# Patient Record
Sex: FEMALE | Race: WHITE | NOT HISPANIC OR LATINO
[De-identification: names, ages, dates, MRNs, and addresses within clinical notes are randomized per-mention and may not be internally consistent; named-entity substitution may affect disease eponyms.]

---

## 2017-08-17 ENCOUNTER — APPOINTMENT (OUTPATIENT)
Dept: INTERNAL MEDICINE | Facility: CLINIC | Age: 57
End: 2017-08-17
Payer: COMMERCIAL

## 2017-08-17 PROCEDURE — 36415 COLL VENOUS BLD VENIPUNCTURE: CPT

## 2017-08-17 PROCEDURE — 94010 BREATHING CAPACITY TEST: CPT

## 2017-08-17 PROCEDURE — 99386 PREV VISIT NEW AGE 40-64: CPT | Mod: 25

## 2019-10-10 ENCOUNTER — APPOINTMENT (OUTPATIENT)
Dept: FAMILY MEDICINE | Facility: CLINIC | Age: 59
End: 2019-10-10
Payer: COMMERCIAL

## 2019-10-10 ENCOUNTER — NON-APPOINTMENT (OUTPATIENT)
Age: 59
End: 2019-10-10

## 2019-10-10 VITALS
HEART RATE: 78 BPM | WEIGHT: 178 LBS | DIASTOLIC BLOOD PRESSURE: 90 MMHG | BODY MASS INDEX: 27.61 KG/M2 | HEIGHT: 67.5 IN | SYSTOLIC BLOOD PRESSURE: 156 MMHG

## 2019-10-10 VITALS — DIASTOLIC BLOOD PRESSURE: 86 MMHG | SYSTOLIC BLOOD PRESSURE: 134 MMHG

## 2019-10-10 DIAGNOSIS — Z83.3 FAMILY HISTORY OF DIABETES MELLITUS: ICD-10-CM

## 2019-10-10 DIAGNOSIS — Z78.9 OTHER SPECIFIED HEALTH STATUS: ICD-10-CM

## 2019-10-10 DIAGNOSIS — Z82.49 FAMILY HISTORY OF ISCHEMIC HEART DISEASE AND OTHER DISEASES OF THE CIRCULATORY SYSTEM: ICD-10-CM

## 2019-10-10 DIAGNOSIS — Z82.5 FAMILY HISTORY OF ASTHMA AND OTHER CHRONIC LOWER RESPIRATORY DISEASES: ICD-10-CM

## 2019-10-10 DIAGNOSIS — M50.20 OTHER CERVICAL DISC DISPLACEMENT, UNSPECIFIED CERVICAL REGION: ICD-10-CM

## 2019-10-10 DIAGNOSIS — Z00.00 ENCOUNTER FOR GENERAL ADULT MEDICAL EXAMINATION W/OUT ABNORMAL FINDINGS: ICD-10-CM

## 2019-10-10 DIAGNOSIS — M25.551 PAIN IN RIGHT HIP: ICD-10-CM

## 2019-10-10 PROCEDURE — 93000 ELECTROCARDIOGRAM COMPLETE: CPT

## 2019-10-10 PROCEDURE — 99213 OFFICE O/P EST LOW 20 MIN: CPT | Mod: 25

## 2019-10-10 PROCEDURE — 99386 PREV VISIT NEW AGE 40-64: CPT | Mod: 25
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
## 2019-10-10 NOTE — PLAN
[FreeTextEntry1] : CHECK ROUTINE LAB WORK\par EKG: NSR AT 69 BPM, NO ACUTE ST-T WAVE CHANGES\par STOOL OCCULT AND COLONOSCOPY ADVISED\par HEALTHY DIET AND LIFESTYLE MODIFICATIONS\par VISION SCREENING\par NEED MAMMOGRAM AND BONE DENSITY RESULTS FROM [facility]\par NEED PAIN  NOTES A IMAGING FROM [facility]\par FLU VACCINE DECLINED, CONSIDER SHINGRIX\par \par \par AVOID ACTIVITIES CAUSING PAIN AND FALL\par WILL START WITH XRAY HIP\par FURTHER RECOMMENDATIONS PENDING RESULTS \par CALL WITH ANY QUESTIONS, CONCERNS OR CHANGES

## 2019-10-10 NOTE — HEALTH RISK ASSESSMENT
[Good] : ~his/her~  mood as  good [Yes] : Yes [1 or 2 (0 pts)] : 1 or 2 (0 points) [2 - 4 times a month (2 pts)] : 2-4 times a month (2 points) [Never (0 pts)] : Never (0 points) [No] : In the past 12 months have you used drugs other than those required for medical reasons? No [One fall no injury in past year] : Patient reported one fall in the past year without injury [Patient reported bone density results were normal] : Patient reported bone density results were normal [Patient reported mammogram was normal] : Patient reported mammogram was normal [HIV Test offered] : HIV Test offered [Hepatitis C test offered] : Hepatitis C test offered [With Significant Other] : lives with significant other [None] : None [With Family] : lives with family [# of Members in Household ___] :  household currently consist of [unfilled] member(s) [Employed] : employed [] :  [High School] : high school [# Of Children ___] : has [unfilled] children [Feels Safe at Home] : Feels safe at home [Fully functional (bathing, dressing, toileting, transferring, walking, feeding)] : Fully functional (bathing, dressing, toileting, transferring, walking, feeding) [Reports changes in hearing] : Reports changes in hearing [Fully functional (using the telephone, shopping, preparing meals, housekeeping, doing laundry, using] : Fully functional and needs no help or supervision to perform IADLs (using the telephone, shopping, preparing meals, housekeeping, doing laundry, using transportation, managing medications and managing finances) [Reports normal functional visual acuity (ie: able to read med bottle)] : Reports normal functional visual acuity [Smoke Detector] : smoke detector [Carbon Monoxide Detector] : carbon monoxide detector [Safety elements used in home] : safety elements used in home [Sunscreen] : uses sunscreen [Seat Belt] :  uses seat belt [With Patient/Caregiver] : With Patient/Caregiver [] : No [de-identified] : SOCIAL [Audit-CScore] : 2 [Change in mental status noted] : No change in mental status noted [Language] : denies difficulty with language [Behavior] : denies difficulty with behavior [Learning/Retaining New Information] : denies difficulty learning/retaining new information [Handling Complex Tasks] : denies difficulty handling complex tasks [Reasoning] : denies difficulty with reasoning [Spatial Ability and Orientation] : denies difficulty with spatial ability and orientation [Reports changes in vision] : Reports no changes in vision [Reports changes in dental health] : Reports no changes in dental health [MammogramDate] : 07/19 [MammogramComments] : [facility] [PapSmearDate] : 2019 [PapSmearComments] : DR. KEY [BoneDensityDate] : 07/19 [BoneDensityComments] : [facility] [ColonoscopyComments] : HAS NOT HAD COLONOSCOPY [de-identified] : LOST HEARING IN RIGHT EAR [de-identified] : WEARS GLASSES FOR READING [de-identified] : SEEN [facility] [AdvancecareDate] : 10/19

## 2019-10-10 NOTE — PHYSICAL EXAM
[No Acute Distress] : no acute distress [Well Nourished] : well nourished [Well-Appearing] : well-appearing [Normal Sclera/Conjunctiva] : normal sclera/conjunctiva [Normal Outer Ear/Nose] : the outer ears and nose were normal in appearance [PERRL] : pupils equal round and reactive to light [No Lymphadenopathy] : no lymphadenopathy [Normal Oropharynx] : the oropharynx was normal [No Respiratory Distress] : no respiratory distress  [No Accessory Muscle Use] : no accessory muscle use [Supple] : supple [Normal Rate] : normal rate  [Clear to Auscultation] : lungs were clear to auscultation bilaterally [Normal S1, S2] : normal S1 and S2 [No Murmur] : no murmur heard [Regular Rhythm] : with a regular rhythm [Pedal Pulses Present] : the pedal pulses are present [No Edema] : there was no peripheral edema [Non Tender] : non-tender [Normal Bowel Sounds] : normal bowel sounds [Soft] : abdomen soft [No Joint Swelling] : no joint swelling [Grossly Normal Strength/Tone] : grossly normal strength/tone [Coordination Grossly Intact] : coordination grossly intact [No Focal Deficits] : no focal deficits [Normal Gait] : normal gait [Speech Grossly Normal] : speech grossly normal [Memory Grossly Normal] : memory grossly normal [Deep Tendon Reflexes (DTR)] : deep tendon reflexes were 2+ and symmetric [Normal Insight/Judgement] : insight and judgment were intact [EOMI] : extraocular movements intact [Normal TMs] : both tympanic membranes were normal [No Rash] : no rash [Alert and Oriented x3] : oriented to person, place, and time

## 2019-10-10 NOTE — REVIEW OF SYSTEMS
[Joint Pain] : joint pain [Fever] : no fever [Chills] : no chills [Fatigue] : no fatigue [Discharge] : no discharge [Pain] : no pain [Earache] : no earache [Hearing Loss] : no hearing loss [Nasal Discharge] : no nasal discharge [Sore Throat] : no sore throat [Chest Pain] : no chest pain [Palpitations] : no palpitations [Lower Ext Edema] : no lower extremity edema [Shortness Of Breath] : no shortness of breath [Wheezing] : no wheezing [Cough] : no cough [Abdominal Pain] : no abdominal pain [Nausea] : no nausea [Diarrhea] : diarrhea [Vomiting] : no vomiting [Dysuria] : no dysuria [Hematuria] : no hematuria [Muscle Weakness] : no muscle weakness [Muscle Pain] : no muscle pain [Itching] : no itching [Skin Rash] : no skin rash [Headache] : no headache [Dizziness] : no dizziness [Fainting] : no fainting [Suicidal] : not suicidal [Anxiety] : no anxiety [Depression] : no depression [Easy Bleeding] : no easy bleeding [Easy Bruising] : no easy bruising

## 2019-11-13 ENCOUNTER — RECORD ABSTRACTING (OUTPATIENT)
Age: 59
End: 2019-11-13

## 2019-12-10 ENCOUNTER — APPOINTMENT (OUTPATIENT)
Dept: FAMILY MEDICINE | Facility: CLINIC | Age: 59
End: 2019-12-10
Payer: COMMERCIAL

## 2019-12-10 VITALS
DIASTOLIC BLOOD PRESSURE: 80 MMHG | HEART RATE: 80 BPM | BODY MASS INDEX: 28.54 KG/M2 | SYSTOLIC BLOOD PRESSURE: 140 MMHG | HEIGHT: 67.5 IN | WEIGHT: 184 LBS

## 2019-12-10 PROCEDURE — 99214 OFFICE O/P EST MOD 30 MIN: CPT

## 2019-12-10 NOTE — PHYSICAL EXAM
[No Acute Distress] : no acute distress [Well Nourished] : well nourished [Well-Appearing] : well-appearing [No Accessory Muscle Use] : no accessory muscle use [No Respiratory Distress] : no respiratory distress  [Normal Rate] : normal rate  [Regular Rhythm] : with a regular rhythm [Clear to Auscultation] : lungs were clear to auscultation bilaterally [Normal S1, S2] : normal S1 and S2 [No Murmur] : no murmur heard [Soft] : abdomen soft [Pedal Pulses Present] : the pedal pulses are present [No Edema] : there was no peripheral edema [Normal Bowel Sounds] : normal bowel sounds [Non Tender] : non-tender [Normal Sclera/Conjunctiva] : normal sclera/conjunctiva [PERRL] : pupils equal round and reactive to light [No Lymphadenopathy] : no lymphadenopathy [Supple] : supple [Speech Grossly Normal] : speech grossly normal [Memory Grossly Normal] : memory grossly normal [Normal Affect] : the affect was normal

## 2019-12-10 NOTE — PLAN
[FreeTextEntry1] : RECENT LAB WORK REVIEWED\par COUNSELLED ON HEALTHY DIET AND LIFESTYLE MODIFICATIONS; HEALTHY WEIGHT LOSS \par START VITAMIN D 1000 DAILY AND WILL MONITOR LEVELS\par WILL ALSO MONITOR LIPIDS AND HBA1C\par FLU VACCINE DECLINED\par CALL WITH ANY QUESTIONS, CONCERNS OR CHANGES

## 2019-12-10 NOTE — REVIEW OF SYSTEMS
[Fever] : no fever [Chills] : no chills [Fatigue] : no fatigue [Discharge] : no discharge [Pain] : no pain [Chest Pain] : no chest pain [Palpitations] : no palpitations [Lower Ext Edema] : no lower extremity edema [Shortness Of Breath] : no shortness of breath [Wheezing] : no wheezing [Cough] : no cough [Abdominal Pain] : no abdominal pain [Nausea] : no nausea [Diarrhea] : diarrhea [Vomiting] : no vomiting [Dysuria] : no dysuria [Hematuria] : no hematuria [Headache] : no headache [Dizziness] : no dizziness [Fainting] : no fainting [Easy Bleeding] : no easy bleeding [Easy Bruising] : no easy bruising

## 2019-12-10 NOTE — HISTORY OF PRESENT ILLNESS
[FreeTextEntry1] : REVIEW LABS [de-identified] : MS. BHANDARI IS A PLEASANT 60 YO PRESENTING FOR FOLLOW-UP TO REVIEW LABS. NO KNOWN HISTORY OF ELEVATED CHOLESTEROL OR GLUCOSE.  DIET IS MOSTLY WELL-BALANCED, BUT IS EATING A LARGE AMOUNT OF CHEESES AND CARBOHYDRATES. IS NOT EXERCISING REGULARLY BUT WANTS TO START. IS NOT TAKING VITAMIN D SUPPLEMENTS. NO KNOWN PRIOR VITAMIN D INSUFFICIENCY.  HAS HAD NO HEADACHES, DIZZINESS, CHEST PAIN, SHORTNESS OF BREATH, GI OR URINARY COMPLAINTS. NO OTHER COMPLAINTS TODAY.  DOES NOT WANT FLU VACCINE.

## 2020-02-24 ENCOUNTER — APPOINTMENT (OUTPATIENT)
Dept: FAMILY MEDICINE | Facility: CLINIC | Age: 60
End: 2020-02-24
Payer: COMMERCIAL

## 2020-02-24 VITALS
WEIGHT: 186 LBS | DIASTOLIC BLOOD PRESSURE: 98 MMHG | BODY MASS INDEX: 28.85 KG/M2 | HEIGHT: 67.5 IN | HEART RATE: 74 BPM | SYSTOLIC BLOOD PRESSURE: 160 MMHG

## 2020-02-24 VITALS — SYSTOLIC BLOOD PRESSURE: 132 MMHG | DIASTOLIC BLOOD PRESSURE: 86 MMHG

## 2020-02-24 DIAGNOSIS — R03.0 ELEVATED BLOOD-PRESSURE READING, W/OUT DIAGNOSIS OF HYPERTENSION: ICD-10-CM

## 2020-02-24 PROCEDURE — 99213 OFFICE O/P EST LOW 20 MIN: CPT

## 2020-02-24 NOTE — PLAN
[FreeTextEntry1] : UNDER INCREASED STRESS AND NERVOUS AT DOCTORS VISITS.  AFTER RELAXING FOR A FEW MINUTES BLOOD PRESSURE MUCH IMPROVED\par SODIUM AVOIDANCE\par AGREES TO FOLLOW-UP IN ONE MONTH FOR BLOOD PRESSURE RECHECK PRIOR TO INITIATION OF POSSIBLE MEDICATION MANAGEMENT\par CALL WITH ANY QUESTIONS, CONCERNS OR CHANGES.

## 2020-02-24 NOTE — PHYSICAL EXAM
[Well Nourished] : well nourished [Well Developed] : well developed [No Acute Distress] : no acute distress [Well-Appearing] : well-appearing [No Respiratory Distress] : no respiratory distress  [Clear to Auscultation] : lungs were clear to auscultation bilaterally [No Accessory Muscle Use] : no accessory muscle use [Normal Rate] : normal rate  [Regular Rhythm] : with a regular rhythm [Normal S1, S2] : normal S1 and S2 [No Joint Swelling] : no joint swelling [Grossly Normal Strength/Tone] : grossly normal strength/tone

## 2020-02-24 NOTE — END OF VISIT
[FreeTextEntry3] : All medical record entries made by the Scribe were at my, Dr. Santiago's, direction and personally dictated by me on [2/24/2020]. I have reviewed the chart and agree that the record accurately reflects my personal performance of the history, physical exam, assessment and plan. I have also personally directed, reviewed and agreed with the chart.

## 2020-02-24 NOTE — ADDENDUM
[FreeTextEntry1] : I, Samantha Ribeiro, acted solely as a scribe for Dr. Santiago on this date [2/24/2020].

## 2020-02-24 NOTE — HISTORY OF PRESENT ILLNESS
[FreeTextEntry1] : FOLLOW UP HYPERTENSION  [de-identified] : MS. BHANDARI IS A PLEASANT 59 YEAR OLD PRESENTING FOR FOLLOW UP. HISTORY OF BORDERLINE HIGH BLOOD PRESSURE. REPORTS STRESS DUE TO A FAMILY MEMBER'S HEALTH. HAS HAD NO PALPITATION OR SYNCOPE. NO CHEST PAIN OR SHORTNESS OF BREATH.  DENIES EDEMA.  HAD BLOOD PRESSURE CHECKED A COUPLE MONTHS AGO PRIOR TO SEEING PAIN MANAGEMENT AND WAS MILDLY ELEVATED.  WAS NERVOUS AND HAD PAIN THAT DAY.  HAS NOT OTHERWISE CHECKED.  HAS NEVER BEEN ON MEDICATION FOR HYPERTENSION.

## 2021-03-30 ENCOUNTER — APPOINTMENT (OUTPATIENT)
Dept: FAMILY MEDICINE | Facility: CLINIC | Age: 61
End: 2021-03-30
Payer: COMMERCIAL

## 2021-03-30 VITALS — SYSTOLIC BLOOD PRESSURE: 132 MMHG | DIASTOLIC BLOOD PRESSURE: 78 MMHG

## 2021-03-30 VITALS
BODY MASS INDEX: 27.92 KG/M2 | HEIGHT: 67.5 IN | HEART RATE: 84 BPM | SYSTOLIC BLOOD PRESSURE: 140 MMHG | DIASTOLIC BLOOD PRESSURE: 70 MMHG | WEIGHT: 180 LBS

## 2021-03-30 DIAGNOSIS — H91.90 UNSPECIFIED HEARING LOSS, UNSPECIFIED EAR: ICD-10-CM

## 2021-03-30 DIAGNOSIS — Z12.39 ENCOUNTER FOR OTHER SCREENING FOR MALIGNANT NEOPLASM OF BREAST: ICD-10-CM

## 2021-03-30 DIAGNOSIS — H93.8X2 OTHER SPECIFIED DISORDERS OF LEFT EAR: ICD-10-CM

## 2021-03-30 PROCEDURE — 99072 ADDL SUPL MATRL&STAF TM PHE: CPT

## 2021-03-30 PROCEDURE — 99213 OFFICE O/P EST LOW 20 MIN: CPT

## 2021-03-30 NOTE — PHYSICAL EXAM
[No Acute Distress] : no acute distress [Well Nourished] : well nourished [Well-Appearing] : well-appearing [Normal Sclera/Conjunctiva] : normal sclera/conjunctiva [PERRL] : pupils equal round and reactive to light [Normal Outer Ear/Nose] : the outer ears and nose were normal in appearance [Normal Oropharynx] : the oropharynx was normal [No Lymphadenopathy] : no lymphadenopathy [Supple] : supple [No Respiratory Distress] : no respiratory distress  [No Accessory Muscle Use] : no accessory muscle use [Normal Rate] : normal rate  [Clear to Auscultation] : lungs were clear to auscultation bilaterally [Regular Rhythm] : with a regular rhythm [Normal S1, S2] : normal S1 and S2 [de-identified] : DULL TM ON LEFT NO ERYTHEMA

## 2021-03-30 NOTE — HISTORY OF PRESENT ILLNESS
[FreeTextEntry1] : F/U EAR PAIN/INFECTION [de-identified] : MS. BHANDARI IS A PLEASANT 59 YO PRESENTING FOR FOLLOW-UP.  ADVISED SHE HAS ECZEMA IN HER EAR.  CAN BE ITCHY.  SCRATCHED IT AND IT BECAME INFLAMED.  WENT TO DERMATOLOGIST ONE MONTH AGO.  HAD BEEN GIVEN A CREAM TO USE WHICH INITIALLY HELPED.  BECAME INCREASINGLY INFLAMED AND SAW ENT THREE WEEKS AGO AND GIVEN TOBRAMYCIN AND DEXAMETHASONE.  HAD STENT PLACED IN EAR SO THAT DROPS COULD GET IN EAR.  FOLLOWED UP ON FRIDAY AND WAS GIVEN METHYLPREDNISOLONE.  GOT BETTER.  MUFFLING SOUND RESOLVED.  STILL SOME CRACKLING.  NO PAIN ANYMORE.  HEARING BETTER NOW.  SEES ENT DR. OSPINA OFFICE.  WAS ADVISED TO HAVE HEARING TEST BUT HAS NOT HAD YET.  HAD FEVER INITIALLY WHICH RESOLVED.  COVID TEST WAS NEGATIVE AT ENT OFFICE.  WAS ALSO GIVEN PO ANTIBIOTICS.  IS NOT USING NASAL SPRAY.  DUE FOR MAMMOGRAM.  HAS NO BREAST COMPLAINTS.  NO PAIN, NIPPLE DISCHARGE, SKIN CHANGES OR LUMPS.

## 2021-03-30 NOTE — PLAN
[FreeTextEntry1] : NEED ENT CONSULTANT NOTE\par AVOIDANCE OF Q-TIPS\par RX NASAL SPRAY - REVIEWED USE\par TO GO FOR HEARING TEST\par ENT FOLLOW-UP\par RX FOR BREAST IMAGING\par FOLLOW-UP ALL SPECIALISTS AS DIRECTED \par FOLLOW-UP FOR CPE\par CALL WITH ANY QUESTIONS, CONCERNS OR CHANGES

## 2021-03-30 NOTE — REVIEW OF SYSTEMS
[Hearing Loss] : hearing loss [Negative] : Heme/Lymph [Sore Throat] : no sore throat [FreeTextEntry4] : SEE HPI

## 2022-07-18 ENCOUNTER — APPOINTMENT (OUTPATIENT)
Dept: INTERNAL MEDICINE | Facility: CLINIC | Age: 62
End: 2022-07-18

## 2022-07-18 VITALS
WEIGHT: 180 LBS | HEIGHT: 67.5 IN | BODY MASS INDEX: 27.92 KG/M2 | SYSTOLIC BLOOD PRESSURE: 128 MMHG | HEART RATE: 82 BPM | DIASTOLIC BLOOD PRESSURE: 83 MMHG

## 2022-07-18 DIAGNOSIS — Z12.11 ENCOUNTER FOR SCREENING FOR MALIGNANT NEOPLASM OF COLON: ICD-10-CM

## 2022-07-18 PROCEDURE — 36415 COLL VENOUS BLD VENIPUNCTURE: CPT

## 2022-07-18 PROCEDURE — 99386 PREV VISIT NEW AGE 40-64: CPT | Mod: 25

## 2022-07-18 NOTE — HISTORY OF PRESENT ILLNESS
[FreeTextEntry1] : physical exam  [de-identified] : Ms. CARLOS BHANDARI is a 62 year female comes to the office for physical exam.  Patient denies fever, cough SOB. No other complaints at this time.

## 2022-07-18 NOTE — HEALTH RISK ASSESSMENT
[Good] : ~his/her~  mood as  good [Never] : Never [Yes] : Yes [Monthly or less (1 pt)] : Monthly or less (1 point) [1 or 2 (0 pts)] : 1 or 2 (0 points) [Never (0 pts)] : Never (0 points) [No] : In the past 12 months have you used drugs other than those required for medical reasons? No [0] : 2) Feeling down, depressed, or hopeless: Not at all (0) [Patient refused screening] : Patient refused screening [PHQ-2 Negative - No further assessment needed] : PHQ-2 Negative - No further assessment needed [Patient reported mammogram was normal] : Patient reported mammogram was normal [HIV test declined] : HIV test declined [Hepatitis C test declined] : Hepatitis C test declined [Audit-CScore] : 1 [[id number]] : 0 [MammogramDate] : 05/22

## 2022-07-19 LAB
25(OH)D3 SERPL-MCNC: 34.1 NG/ML
ALBUMIN SERPL ELPH-MCNC: 4.8 G/DL
ALP BLD-CCNC: 64 U/L
ALT SERPL-CCNC: 23 U/L
ANION GAP SERPL CALC-SCNC: 12 MMOL/L
AST SERPL-CCNC: 23 U/L
BASOPHILS # BLD AUTO: 0.02 K/UL
BASOPHILS NFR BLD AUTO: 0.3 %
BILIRUB SERPL-MCNC: 1.1 MG/DL
BUN SERPL-MCNC: 16 MG/DL
CALCIUM SERPL-MCNC: 10 MG/DL
CHLORIDE SERPL-SCNC: 105 MMOL/L
CHOLEST SERPL-MCNC: 270 MG/DL
CO2 SERPL-SCNC: 24 MMOL/L
CREAT SERPL-MCNC: 0.58 MG/DL
EGFR: 103 ML/MIN/1.73M2
EOSINOPHIL # BLD AUTO: 0.11 K/UL
EOSINOPHIL NFR BLD AUTO: 1.7 %
ESTIMATED AVERAGE GLUCOSE: 131 MG/DL
GLUCOSE SERPL-MCNC: 101 MG/DL
HBA1C MFR BLD HPLC: 6.2 %
HCT VFR BLD CALC: 42.8 %
HDLC SERPL-MCNC: 70 MG/DL
HGB BLD-MCNC: 13.8 G/DL
IMM GRANULOCYTES NFR BLD AUTO: 0.3 %
LDLC SERPL CALC-MCNC: 177 MG/DL
LYMPHOCYTES # BLD AUTO: 2.71 K/UL
LYMPHOCYTES NFR BLD AUTO: 40.9 %
MAN DIFF?: NORMAL
MCHC RBC-ENTMCNC: 29.7 PG
MCHC RBC-ENTMCNC: 32.2 GM/DL
MCV RBC AUTO: 92 FL
MONOCYTES # BLD AUTO: 0.57 K/UL
MONOCYTES NFR BLD AUTO: 8.6 %
NEUTROPHILS # BLD AUTO: 3.2 K/UL
NEUTROPHILS NFR BLD AUTO: 48.2 %
NONHDLC SERPL-MCNC: 200 MG/DL
PLATELET # BLD AUTO: 286 K/UL
POTASSIUM SERPL-SCNC: 4.1 MMOL/L
PROT SERPL-MCNC: 7.4 G/DL
RBC # BLD: 4.65 M/UL
RBC # FLD: 13.7 %
SODIUM SERPL-SCNC: 140 MMOL/L
TRIGL SERPL-MCNC: 117 MG/DL
TSH SERPL-ACNC: 0.62 UIU/ML
WBC # FLD AUTO: 6.63 K/UL

## 2022-07-31 ENCOUNTER — NON-APPOINTMENT (OUTPATIENT)
Age: 62
End: 2022-07-31

## 2022-08-11 ENCOUNTER — NON-APPOINTMENT (OUTPATIENT)
Age: 62
End: 2022-08-11

## 2022-10-25 ENCOUNTER — APPOINTMENT (OUTPATIENT)
Dept: INTERNAL MEDICINE | Facility: CLINIC | Age: 62
End: 2022-10-25

## 2022-10-25 VITALS
HEART RATE: 84 BPM | OXYGEN SATURATION: 98 % | BODY MASS INDEX: 28.25 KG/M2 | WEIGHT: 180 LBS | HEIGHT: 67 IN | DIASTOLIC BLOOD PRESSURE: 80 MMHG | SYSTOLIC BLOOD PRESSURE: 136 MMHG

## 2022-10-25 DIAGNOSIS — H60.90 UNSPECIFIED OTITIS EXTERNA, UNSPECIFIED EAR: ICD-10-CM

## 2022-10-25 PROCEDURE — 36415 COLL VENOUS BLD VENIPUNCTURE: CPT

## 2022-10-25 PROCEDURE — 99214 OFFICE O/P EST MOD 30 MIN: CPT | Mod: 25

## 2022-10-25 NOTE — HISTORY OF PRESENT ILLNESS
[FreeTextEntry1] : follow up/ right ear pain  [de-identified] : Ms. CARLOS BHANDARI is a 62 year female comes to the office for follow up of chronic medical conditions and right ear pain x 2 weeks. patient denies change in hearing.    Patient denies fever, cough SOB. No other complaints at this time.

## 2022-10-25 NOTE — HEALTH RISK ASSESSMENT
[0] : 2) Feeling down, depressed, or hopeless: Not at all (0) [PHQ-2 Negative - No further assessment needed] : PHQ-2 Negative - No further assessment needed [[id number]] : 0

## 2022-10-25 NOTE — PHYSICAL EXAM
[No Acute Distress] : no acute distress [Well Nourished] : well nourished [Well Developed] : well developed [Well-Appearing] : well-appearing [Normal Sclera/Conjunctiva] : normal sclera/conjunctiva [PERRL] : pupils equal round and reactive to light [EOMI] : extraocular movements intact [Normal Outer Ear/Nose] : the outer ears and nose were normal in appearance [Normal Oropharynx] : the oropharynx was normal [No JVD] : no jugular venous distention [No Lymphadenopathy] : no lymphadenopathy [Supple] : supple [Thyroid Normal, No Nodules] : the thyroid was normal and there were no nodules present [No Respiratory Distress] : no respiratory distress  [No Accessory Muscle Use] : no accessory muscle use [Clear to Auscultation] : lungs were clear to auscultation bilaterally [Normal Rate] : normal rate  [Regular Rhythm] : with a regular rhythm [Normal S1, S2] : normal S1 and S2 [No Murmur] : no murmur heard [No Carotid Bruits] : no carotid bruits [No Abdominal Bruit] : a ~M bruit was not heard ~T in the abdomen [No Varicosities] : no varicosities [Pedal Pulses Present] : the pedal pulses are present [No Edema] : there was no peripheral edema [No Palpable Aorta] : no palpable aorta [No Extremity Clubbing/Cyanosis] : no extremity clubbing/cyanosis [Soft] : abdomen soft [Non Tender] : non-tender [Non-distended] : non-distended [No Masses] : no abdominal mass palpated [No HSM] : no HSM [Normal Bowel Sounds] : normal bowel sounds [Normal Posterior Cervical Nodes] : no posterior cervical lymphadenopathy [Normal Anterior Cervical Nodes] : no anterior cervical lymphadenopathy [No CVA Tenderness] : no CVA  tenderness [No Spinal Tenderness] : no spinal tenderness [No Joint Swelling] : no joint swelling [Grossly Normal Strength/Tone] : grossly normal strength/tone [No Rash] : no rash [Coordination Grossly Intact] : coordination grossly intact [No Focal Deficits] : no focal deficits [Normal Gait] : normal gait [Deep Tendon Reflexes (DTR)] : deep tendon reflexes were 2+ and symmetric [Normal Affect] : the affect was normal [Normal Insight/Judgement] : insight and judgment were intact [de-identified] : RIght ear canal with white discahrge. Left ear normal

## 2022-10-25 NOTE — PLAN
[FreeTextEntry1] : otitis externa- will prescribe otic antibiotics. \par \par HLD- will test lipid panel today and call patient with results\par Patient will continue atorvastatin as prescribed. \par \par Prior to appointment and during encounter with patient extensive medical records were reviewed including but not limited to, Hospital records records, out patient records, laboratory data and microbiology data \par In addition extensive time was also spent in reviewing diagnostic studies.\par \par Total encounter total time 30 mins\par >50% of time spent counseling/coordinating care\par \par \par Counseling included abnormal lab results, differential diagnoses, treatment options, risks and benefits, lifestyle changes, current condition, medications, and dose adjustments. \par The patient was interactive, attentive, asked questions, and verbalized understanding

## 2022-10-26 LAB
ALBUMIN SERPL ELPH-MCNC: 4.8 G/DL
ALP BLD-CCNC: 71 U/L
ALT SERPL-CCNC: 32 U/L
ANION GAP SERPL CALC-SCNC: 13 MMOL/L
AST SERPL-CCNC: 29 U/L
BASOPHILS # BLD AUTO: 0.03 K/UL
BASOPHILS NFR BLD AUTO: 0.5 %
BILIRUB SERPL-MCNC: 0.9 MG/DL
BUN SERPL-MCNC: 18 MG/DL
CALCIUM SERPL-MCNC: 9.9 MG/DL
CHLORIDE SERPL-SCNC: 104 MMOL/L
CHOLEST SERPL-MCNC: 179 MG/DL
CO2 SERPL-SCNC: 25 MMOL/L
CREAT SERPL-MCNC: 0.56 MG/DL
EGFR: 104 ML/MIN/1.73M2
EOSINOPHIL # BLD AUTO: 0.13 K/UL
EOSINOPHIL NFR BLD AUTO: 2 %
ESTIMATED AVERAGE GLUCOSE: 126 MG/DL
GLUCOSE SERPL-MCNC: 124 MG/DL
HBA1C MFR BLD HPLC: 6 %
HCT VFR BLD CALC: 41.4 %
HDLC SERPL-MCNC: 65 MG/DL
HGB BLD-MCNC: 13.3 G/DL
IMM GRANULOCYTES NFR BLD AUTO: 0.2 %
LDLC SERPL CALC-MCNC: 88 MG/DL
LYMPHOCYTES # BLD AUTO: 2.92 K/UL
LYMPHOCYTES NFR BLD AUTO: 45.6 %
MAN DIFF?: NORMAL
MCHC RBC-ENTMCNC: 30.2 PG
MCHC RBC-ENTMCNC: 32.1 GM/DL
MCV RBC AUTO: 93.9 FL
MONOCYTES # BLD AUTO: 0.55 K/UL
MONOCYTES NFR BLD AUTO: 8.6 %
NEUTROPHILS # BLD AUTO: 2.77 K/UL
NEUTROPHILS NFR BLD AUTO: 43.1 %
NONHDLC SERPL-MCNC: 114 MG/DL
PLATELET # BLD AUTO: 272 K/UL
POTASSIUM SERPL-SCNC: 4.4 MMOL/L
PROT SERPL-MCNC: 7.5 G/DL
RBC # BLD: 4.41 M/UL
RBC # FLD: 13.4 %
SODIUM SERPL-SCNC: 142 MMOL/L
TRIGL SERPL-MCNC: 131 MG/DL
WBC # FLD AUTO: 6.41 K/UL

## 2023-05-18 ENCOUNTER — APPOINTMENT (OUTPATIENT)
Dept: INTERNAL MEDICINE | Facility: CLINIC | Age: 63
End: 2023-05-18
Payer: MEDICAID

## 2023-05-18 VITALS
DIASTOLIC BLOOD PRESSURE: 78 MMHG | SYSTOLIC BLOOD PRESSURE: 138 MMHG | HEIGHT: 67 IN | WEIGHT: 180 LBS | BODY MASS INDEX: 28.25 KG/M2 | HEART RATE: 93 BPM

## 2023-05-18 DIAGNOSIS — M79.671 PAIN IN RIGHT FOOT: ICD-10-CM

## 2023-05-18 PROCEDURE — 99214 OFFICE O/P EST MOD 30 MIN: CPT

## 2023-05-18 NOTE — HISTORY OF PRESENT ILLNESS
[FreeTextEntry1] : right foot pain/ left wrist pain.  [de-identified] : Ms. CARLOS BHANDARI is a 62 year female comes to the office c/o right foot pain x several months getting progressively worse over the past 2-3 weeks. Pain is 3/10 non radiating worse with walking, relieved with rest. Patient also complains of left elbow and left wrist pain x 1-2 weeks 4/10 non radiating worse with movement, relieved with rest. Patient denies fever, cough SOB. No other complaints at this time.

## 2023-05-18 NOTE — PHYSICAL EXAM
[No Acute Distress] : no acute distress [Well Nourished] : well nourished [Well Developed] : well developed [Well-Appearing] : well-appearing [Normal Sclera/Conjunctiva] : normal sclera/conjunctiva [PERRL] : pupils equal round and reactive to light [EOMI] : extraocular movements intact [Normal Outer Ear/Nose] : the outer ears and nose were normal in appearance [Normal Oropharynx] : the oropharynx was normal [No JVD] : no jugular venous distention [No Lymphadenopathy] : no lymphadenopathy [Supple] : supple [Thyroid Normal, No Nodules] : the thyroid was normal and there were no nodules present [No Respiratory Distress] : no respiratory distress  [No Accessory Muscle Use] : no accessory muscle use [Clear to Auscultation] : lungs were clear to auscultation bilaterally [Normal Rate] : normal rate  [Regular Rhythm] : with a regular rhythm [Normal S1, S2] : normal S1 and S2 [No Murmur] : no murmur heard [No Carotid Bruits] : no carotid bruits [No Abdominal Bruit] : a ~M bruit was not heard ~T in the abdomen [No Varicosities] : no varicosities [Pedal Pulses Present] : the pedal pulses are present [No Edema] : there was no peripheral edema [No Palpable Aorta] : no palpable aorta [No Extremity Clubbing/Cyanosis] : no extremity clubbing/cyanosis [Soft] : abdomen soft [Non Tender] : non-tender [Non-distended] : non-distended [No Masses] : no abdominal mass palpated [No HSM] : no HSM [Normal Bowel Sounds] : normal bowel sounds [Normal Posterior Cervical Nodes] : no posterior cervical lymphadenopathy [Normal Anterior Cervical Nodes] : no anterior cervical lymphadenopathy [No CVA Tenderness] : no CVA  tenderness [No Spinal Tenderness] : no spinal tenderness [No Joint Swelling] : no joint swelling [Grossly Normal Strength/Tone] : grossly normal strength/tone [No Rash] : no rash [Coordination Grossly Intact] : coordination grossly intact [No Focal Deficits] : no focal deficits [Normal Gait] : normal gait [Deep Tendon Reflexes (DTR)] : deep tendon reflexes were 2+ and symmetric [Normal Affect] : the affect was normal [Normal Insight/Judgement] : insight and judgment were intact [de-identified] : pain with palpation of right mid foot and left wrist.

## 2023-05-18 NOTE — HEALTH RISK ASSESSMENT
[0] : 2) Feeling down, depressed, or hopeless: Not at all (0) [PHQ-2 Negative - No further assessment needed] : PHQ-2 Negative - No further assessment needed [[id number]] : 0 [Never] : Never

## 2023-06-30 ENCOUNTER — APPOINTMENT (OUTPATIENT)
Dept: ORTHOPEDIC SURGERY | Facility: CLINIC | Age: 63
End: 2023-06-30
Payer: MEDICAID

## 2023-06-30 VITALS — DIASTOLIC BLOOD PRESSURE: 83 MMHG | HEART RATE: 75 BPM | SYSTOLIC BLOOD PRESSURE: 151 MMHG

## 2023-06-30 DIAGNOSIS — G56.03 CARPAL TUNNEL SYNDROM,BILATERAL UPPER LIMBS: ICD-10-CM

## 2023-06-30 DIAGNOSIS — M25.531 PAIN IN RIGHT WRIST: ICD-10-CM

## 2023-06-30 DIAGNOSIS — M25.532 PAIN IN LEFT WRIST: ICD-10-CM

## 2023-06-30 PROCEDURE — 99204 OFFICE O/P NEW MOD 45 MIN: CPT | Mod: 25

## 2023-06-30 PROCEDURE — 73110 X-RAY EXAM OF WRIST: CPT | Mod: 50

## 2023-06-30 PROCEDURE — 20526 THER INJECTION CARP TUNNEL: CPT | Mod: 50

## 2023-06-30 NOTE — PHYSICAL EXAM
[de-identified] : Examination of the bilateral wrist reveals discomfort with compression at the level of the volar carpal tunnel eliciting numbness/tingling throughout the fingertips\par \par Examination at the level of the bilateral wrist reveals tenderness at the level of the first dorsal compartment with a positive finkelstein.\par Examination of the bilateral thumb basal joint reveals pain with compression of the CMC joint with a positive grind test for pain\par \par  [de-identified] : [4] views of [bilateral hands and wrists] were obtained today in my office and were seen by me and discussed with the patient. 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

## 2023-06-30 NOTE — ASSESSMENT
[FreeTextEntry1] : ASSESSMENT:\par The patient comes in today with chronic exacerbated symptoms of bilateral carpal tunnel disease with significant wrist and hand discomfort as well as bilateral basal joint arthropathy and adjacent tendinopathy bilaterally.  At this point in time it is prudent to commence bracing.  She is reluctant to proceed with any kind of nerve study and her symptoms have been present for quite some time now.  She elects for bilateral carpal tunnel injections\par \par The patient was adequately and thoroughly informed of my assessment of their current condition(s).  - This may diminish bodily function for the extremity.\par We discussed prognosis, treatment modalities including operative and nonoperative options for the above diagnostic assessment.\par [For this I was able to review other physician’s note(s) including reviewing other tests, imaging results as well as personally view these results for my own interpretation.]\par \par \par Injection:\par \par The risks and benefits of a steroid injection were discussed in detail. The risks include but are not limited to: pain, infection, swelling, flare response, bleeding, subcutaneous fat atrophy, skin depigmentation and/or elevation of blood sugar. The risk of incomplete resolution of symptoms, recurrence and additional intervention was reviewed and considered by the patient. \par The patient agreed to proceed and under a sterile prep, I injected 1 unit into 2 cc of a combination of Celestone and Lidocaine into the left carpal tunnel, right carpal tunnel. The patient tolerated the injection well.\par \par The patient was adequately and thoroughly informed of my assessment of their current condition(s). \par \par DISCUSSION:\par 1.  I am hopeful that the injection for bilateral carpal tunnels will lead to improvement of her symptoms.  She will brace for the condition as needed\par 2.  We will continue to observe her basal joint arthropathy symptoms bilaterally\par \par

## 2023-06-30 NOTE — HISTORY OF PRESENT ILLNESS
[FreeTextEntry1] : Tiffanie is a very pleasant 63-year-old female who for multiple years now has been suffering from numbness and tingling to bilateral wrist hands and fingertips.  She describes discomfort at the wrists.  She tells me that she did have a nerve study performed in the past and is fully adamant that she would not like to proceed with any kind of nerve study.\par \par She also describes pain at the level of bilateral wrists and bases of the thumbs with difficulty with activities that require pinch and

## 2023-07-19 ENCOUNTER — RX RENEWAL (OUTPATIENT)
Age: 63
End: 2023-07-19

## 2023-09-01 ENCOUNTER — APPOINTMENT (OUTPATIENT)
Dept: INTERNAL MEDICINE | Facility: CLINIC | Age: 63
End: 2023-09-01
Payer: MEDICAID

## 2023-09-01 VITALS
BODY MASS INDEX: 28.25 KG/M2 | WEIGHT: 180 LBS | SYSTOLIC BLOOD PRESSURE: 130 MMHG | HEIGHT: 67 IN | DIASTOLIC BLOOD PRESSURE: 70 MMHG

## 2023-09-01 DIAGNOSIS — Z09 ENCOUNTER FOR FOLLOW-UP EXAMINATION AFTER COMPLETED TREATMENT FOR CONDITIONS OTHER THAN MALIGNANT NEOPLASM: ICD-10-CM

## 2023-09-01 PROCEDURE — 99214 OFFICE O/P EST MOD 30 MIN: CPT

## 2023-09-01 NOTE — HEALTH RISK ASSESSMENT
[0] : 2) Feeling down, depressed, or hopeless: Not at all (0) [PHQ-2 Negative - No further assessment needed] : PHQ-2 Negative - No further assessment needed [[id number]] : 0

## 2023-09-01 NOTE — PHYSICAL EXAM
[No Acute Distress] : no acute distress [Well Nourished] : well nourished [Well Developed] : well developed [Well-Appearing] : well-appearing [Normal Outer Ear/Nose] : the outer ears and nose were normal in appearance [Normal Oropharynx] : the oropharynx was normal [No JVD] : no jugular venous distention [No Lymphadenopathy] : no lymphadenopathy [Supple] : supple [No Respiratory Distress] : no respiratory distress  [No Accessory Muscle Use] : no accessory muscle use [Clear to Auscultation] : lungs were clear to auscultation bilaterally [Normal Rate] : normal rate  [Regular Rhythm] : with a regular rhythm [Normal S1, S2] : normal S1 and S2 [No Carotid Bruits] : no carotid bruits [No Abdominal Bruit] : a ~M bruit was not heard ~T in the abdomen [No Varicosities] : no varicosities [No Edema] : there was no peripheral edema [No Palpable Aorta] : no palpable aorta [No Extremity Clubbing/Cyanosis] : no extremity clubbing/cyanosis [Soft] : abdomen soft [Non Tender] : non-tender [Non-distended] : non-distended [No HSM] : no HSM [Normal Bowel Sounds] : normal bowel sounds [Normal Posterior Cervical Nodes] : no posterior cervical lymphadenopathy [Normal Anterior Cervical Nodes] : no anterior cervical lymphadenopathy [No CVA Tenderness] : no CVA  tenderness [No Spinal Tenderness] : no spinal tenderness [No Joint Swelling] : no joint swelling [Grossly Normal Strength/Tone] : grossly normal strength/tone [No Rash] : no rash [Coordination Grossly Intact] : coordination grossly intact [Normal Gait] : normal gait [Normal Affect] : the affect was normal [Normal Insight/Judgement] : insight and judgment were intact [de-identified] : right side facial droop, patient has eye patch over right eye

## 2023-09-01 NOTE — PLAN
[FreeTextEntry1] : Hospital discharge follow up  Bell's Palsy- Patient will complete medication as prescribed by ER- patient will follow with Neurologist   Prior to appointment and during encounter with patient extensive medical records were reviewed including but not limited to, Hospital records, out patient records, laboratory data and microbiology data In addition extensive time was also spent in reviewing diagnostic studies.   Total encounter total time 30 mins >50% of time spent counseling/coordinating care  Counseling included abnormal lab results, differential diagnoses, treatment options, risks and benefits, lifestyle changes, current condition, medications, and dose adjustments.  The patient was interactive, attentive, asked questions, and verbalized understanding

## 2023-09-01 NOTE — HISTORY OF PRESENT ILLNESS
[FreeTextEntry1] : follow up after hospital discharge [de-identified] : Ms. CARLOS BHANDARI is a 63 year female comes to the office c/o for follow up after hospital discharge.  On 08/30/23 patient went to [facility] for right side facial droop. Blood work, MRI Brain,CXR and CT Angio brain were all unremarkable and ruled out stroke, patient was diagnosed with Bell's Palsy abd discharged home same day with Valacyclovir and Prednisone.   In office today patient feels well and has no complaints at this time

## 2023-09-11 ENCOUNTER — APPOINTMENT (OUTPATIENT)
Dept: ORTHOPEDIC SURGERY | Facility: CLINIC | Age: 63
End: 2023-09-11

## 2023-09-19 ENCOUNTER — APPOINTMENT (OUTPATIENT)
Dept: INTERNAL MEDICINE | Facility: CLINIC | Age: 63
End: 2023-09-19
Payer: MEDICAID

## 2023-09-19 VITALS
HEIGHT: 67 IN | DIASTOLIC BLOOD PRESSURE: 86 MMHG | BODY MASS INDEX: 28.56 KG/M2 | TEMPERATURE: 98.8 F | HEART RATE: 72 BPM | SYSTOLIC BLOOD PRESSURE: 130 MMHG | WEIGHT: 182 LBS

## 2023-09-19 DIAGNOSIS — J06.9 ACUTE UPPER RESPIRATORY INFECTION, UNSPECIFIED: ICD-10-CM

## 2023-09-19 DIAGNOSIS — H66.90 OTITIS MEDIA, UNSPECIFIED, UNSPECIFIED EAR: ICD-10-CM

## 2023-09-19 PROCEDURE — 99214 OFFICE O/P EST MOD 30 MIN: CPT

## 2023-09-19 RX ORDER — NEOMYCIN AND POLYMYXIN B SULFATES AND HYDROCORTISONE OTIC 10; 3.5; 1 MG/ML; MG/ML; [USP'U]/ML
3.5-10000-1 SUSPENSION AURICULAR (OTIC) 4 TIMES DAILY
Qty: 1 | Refills: 1 | Status: DISCONTINUED | COMMUNITY
Start: 2022-10-25 | End: 2023-09-19

## 2023-09-19 RX ORDER — FLUTICASONE PROPIONATE 50 UG/1
50 SPRAY, METERED NASAL DAILY
Qty: 1 | Refills: 0 | Status: DISCONTINUED | COMMUNITY
Start: 2021-03-30 | End: 2023-09-19

## 2023-09-19 RX ORDER — DICLOFENAC SODIUM 1% 10 MG/G
1 GEL TOPICAL
Qty: 200 | Refills: 0 | Status: DISCONTINUED | COMMUNITY
Start: 2023-05-18 | End: 2023-09-19

## 2023-09-19 RX ORDER — TRIAMCINOLONE ACETONIDE 5 MG/G
0.5 OINTMENT TOPICAL
Qty: 30 | Refills: 0 | Status: DISCONTINUED | COMMUNITY
Start: 2021-03-05 | End: 2023-09-19

## 2023-09-20 LAB
RAPID RVP RESULT: NOT DETECTED
SARS-COV-2 RNA PNL RESP NAA+PROBE: NOT DETECTED

## 2023-09-28 ENCOUNTER — APPOINTMENT (OUTPATIENT)
Dept: OTOLARYNGOLOGY | Facility: CLINIC | Age: 63
End: 2023-09-28
Payer: MEDICAID

## 2023-09-28 VITALS
SYSTOLIC BLOOD PRESSURE: 111 MMHG | HEIGHT: 67 IN | WEIGHT: 179 LBS | BODY MASS INDEX: 28.09 KG/M2 | DIASTOLIC BLOOD PRESSURE: 70 MMHG | HEART RATE: 73 BPM

## 2023-09-28 PROCEDURE — 31231 NASAL ENDOSCOPY DX: CPT

## 2023-09-28 PROCEDURE — 99204 OFFICE O/P NEW MOD 45 MIN: CPT | Mod: 25

## 2023-10-02 ENCOUNTER — NON-APPOINTMENT (OUTPATIENT)
Age: 63
End: 2023-10-02

## 2023-10-02 LAB
EBV EA AB SER IA-ACNC: 61.5 U/ML
EBV EA AB TITR SER IF: POSITIVE
EBV EA IGG SER QL IA: >600 U/ML
EBV EA IGG SER-ACNC: POSITIVE
EBV EA IGM SER IA-ACNC: POSITIVE
EBV PATRN SPEC IB-IMP: NORMAL
EBV VCA IGG SER IA-ACNC: 615 U/ML
EBV VCA IGM SER QL IA: 130 U/ML
EPSTEIN-BARR VIRUS CAPSID ANTIGEN IGG: POSITIVE

## 2023-10-11 ENCOUNTER — NON-APPOINTMENT (OUTPATIENT)
Age: 63
End: 2023-10-11

## 2023-10-11 RX ORDER — BECLOMETHASONE DIPROPIONATE HFA 80 UG/1
80 AEROSOL, METERED RESPIRATORY (INHALATION)
Qty: 1 | Refills: 3 | Status: ACTIVE | COMMUNITY
Start: 2023-10-11 | End: 1900-01-01

## 2023-10-16 ENCOUNTER — APPOINTMENT (OUTPATIENT)
Dept: INTERNAL MEDICINE | Facility: CLINIC | Age: 63
End: 2023-10-16
Payer: MEDICAID

## 2023-10-16 VITALS
SYSTOLIC BLOOD PRESSURE: 110 MMHG | DIASTOLIC BLOOD PRESSURE: 70 MMHG | WEIGHT: 179 LBS | HEART RATE: 78 BPM | HEIGHT: 67 IN | BODY MASS INDEX: 28.09 KG/M2

## 2023-10-16 DIAGNOSIS — H57.11 OCULAR PAIN, RIGHT EYE: ICD-10-CM

## 2023-10-16 PROCEDURE — 99213 OFFICE O/P EST LOW 20 MIN: CPT

## 2023-10-16 RX ORDER — PREDNISONE 10 MG/1
10 TABLET ORAL
Qty: 42 | Refills: 0 | Status: COMPLETED | COMMUNITY
Start: 2023-09-28 | End: 2023-10-16

## 2023-10-17 ENCOUNTER — OUTPATIENT (OUTPATIENT)
Dept: OUTPATIENT SERVICES | Facility: HOSPITAL | Age: 63
LOS: 1 days | End: 2023-10-17
Payer: MEDICAID

## 2023-10-17 ENCOUNTER — APPOINTMENT (OUTPATIENT)
Dept: CT IMAGING | Facility: CLINIC | Age: 63
End: 2023-10-17

## 2023-10-17 DIAGNOSIS — J34.3 HYPERTROPHY OF NASAL TURBINATES: ICD-10-CM

## 2023-10-17 DIAGNOSIS — J30.9 ALLERGIC RHINITIS, UNSPECIFIED: ICD-10-CM

## 2023-10-17 DIAGNOSIS — J32.9 CHRONIC SINUSITIS, UNSPECIFIED: ICD-10-CM

## 2023-10-17 PROCEDURE — 70486 CT MAXILLOFACIAL W/O DYE: CPT | Mod: 26

## 2023-10-20 ENCOUNTER — RX ONLY (RX ONLY)
Age: 63
End: 2023-10-20

## 2023-10-20 ENCOUNTER — OFFICE (OUTPATIENT)
Dept: URBAN - METROPOLITAN AREA CLINIC 104 | Facility: CLINIC | Age: 63
Setting detail: OPHTHALMOLOGY
End: 2023-10-20
Payer: MEDICAID

## 2023-10-20 DIAGNOSIS — H25.13: ICD-10-CM

## 2023-10-20 DIAGNOSIS — H02.221: ICD-10-CM

## 2023-10-20 DIAGNOSIS — H16.223: ICD-10-CM

## 2023-10-20 PROCEDURE — 92004 COMPRE OPH EXAM NEW PT 1/>: CPT | Performed by: OPHTHALMOLOGY

## 2023-10-20 ASSESSMENT — REFRACTION_MANIFEST
OS_SPHERE: +0.25
OS_ADD: +2.50
OU_VA: 20/20
OD_AXIS: 120
OD_VA1: 20/25
OD_CYLINDER: -1.25
OD_SPHERE: +1.25
OS_CYLINDER: SPH
OS_VA1: 20/20-1
OD_ADD: +2.50

## 2023-10-20 ASSESSMENT — SUPERFICIAL PUNCTATE KERATITIS (SPK)
OD_SPK: 1+ 2+
OS_SPK: 1+ 2+

## 2023-10-20 ASSESSMENT — REFRACTION_AUTOREFRACTION
OD_SPHERE: +1.75
OS_CYLINDER: SPH
OD_CYLINDER: -1.25
OS_SPHERE: +0.50
OD_AXIS: 121

## 2023-10-20 ASSESSMENT — KERATOMETRY
OS_AXISANGLE_DEGREES: 098
OD_K1POWER_DIOPTERS: 44.00
OS_K1POWER_DIOPTERS: 44.23
OS_K2POWER_DIOPTERS: 45.42
OD_AXISANGLE_DEGREES: 082
OD_K2POWER_DIOPTERS: 45.12

## 2023-10-20 ASSESSMENT — AXIALLENGTH_DERIVED
OD_AL: 22.7923
OD_AL: 22.9756

## 2023-10-20 ASSESSMENT — SPHEQUIV_DERIVED
OD_SPHEQUIV: 0.625
OD_SPHEQUIV: 1.125

## 2023-10-20 ASSESSMENT — REFRACTION_CURRENTRX
OS_OVR_VA: 20/
OD_OVR_VA: 20/

## 2023-10-20 ASSESSMENT — CONFRONTATIONAL VISUAL FIELD TEST (CVF)
OS_FINDINGS: FULL
OD_FINDINGS: FULL

## 2023-10-20 ASSESSMENT — VISUAL ACUITY
OD_BCVA: 20/30
OS_BCVA: 20/40

## 2023-11-15 ENCOUNTER — OFFICE (OUTPATIENT)
Dept: URBAN - METROPOLITAN AREA CLINIC 104 | Facility: CLINIC | Age: 63
Setting detail: OPHTHALMOLOGY
End: 2023-11-15
Payer: MEDICAID

## 2023-11-15 DIAGNOSIS — H02.232: ICD-10-CM

## 2023-11-15 DIAGNOSIS — H02.231: ICD-10-CM

## 2023-11-15 PROBLEM — G51.0 BELLS PALSY: Status: ACTIVE | Noted: 2023-11-15

## 2023-11-15 PROCEDURE — 99213 OFFICE O/P EST LOW 20 MIN: CPT | Performed by: OPHTHALMOLOGY

## 2023-11-15 PROCEDURE — 92285 EXTERNAL OCULAR PHOTOGRAPHY: CPT | Performed by: OPHTHALMOLOGY

## 2023-11-15 ASSESSMENT — CONFRONTATIONAL VISUAL FIELD TEST (CVF)
OS_FINDINGS: FULL
OD_FINDINGS: FULL

## 2023-11-15 ASSESSMENT — SUPERFICIAL PUNCTATE KERATITIS (SPK)
OS_SPK: 1+ 2+
OD_SPK: 1+ 2+

## 2024-01-01 PROBLEM — J30.9 ALLERGIC RHINITIS: Status: ACTIVE | Noted: 2023-09-28

## 2024-01-01 PROBLEM — J32.9 CHRONIC SINUSITIS: Status: ACTIVE | Noted: 2023-09-28

## 2024-01-01 PROBLEM — J34.3 HYPERTROPHY OF NASAL TURBINATES: Status: ACTIVE | Noted: 2023-09-28

## 2024-01-01 PROBLEM — R05.3 UNEXPLAINED CHRONIC COUGH: Status: ACTIVE | Noted: 2023-09-28

## 2024-01-02 ENCOUNTER — APPOINTMENT (OUTPATIENT)
Dept: OTOLARYNGOLOGY | Facility: CLINIC | Age: 64
End: 2024-01-02
Payer: COMMERCIAL

## 2024-01-02 VITALS
BODY MASS INDEX: 29.19 KG/M2 | HEIGHT: 67 IN | WEIGHT: 186 LBS | SYSTOLIC BLOOD PRESSURE: 136 MMHG | HEART RATE: 97 BPM | DIASTOLIC BLOOD PRESSURE: 85 MMHG

## 2024-01-02 DIAGNOSIS — L29.9 PRURITUS, UNSPECIFIED: ICD-10-CM

## 2024-01-02 DIAGNOSIS — J34.3 HYPERTROPHY OF NASAL TURBINATES: ICD-10-CM

## 2024-01-02 DIAGNOSIS — J30.9 ALLERGIC RHINITIS, UNSPECIFIED: ICD-10-CM

## 2024-01-02 DIAGNOSIS — J32.9 CHRONIC SINUSITIS, UNSPECIFIED: ICD-10-CM

## 2024-01-02 DIAGNOSIS — R05.3 CHRONIC COUGH: ICD-10-CM

## 2024-01-02 PROCEDURE — 99214 OFFICE O/P EST MOD 30 MIN: CPT

## 2024-01-02 RX ORDER — FLUOCINOLONE ACETONIDE 0.11 MG/ML
0.01 OIL AURICULAR (OTIC)
Qty: 1 | Refills: 2 | Status: ACTIVE | COMMUNITY
Start: 2024-01-02 | End: 1900-01-01

## 2024-01-02 RX ORDER — CARBOXYMETHYLCELLULOSE SODIUM 1 %
1 DROPS, LIQUID GEL OPHTHALMIC (EYE)
Qty: 1 | Refills: 0 | Status: ACTIVE | COMMUNITY
Start: 2024-01-02 | End: 1900-01-01

## 2024-01-02 NOTE — PLAN
[TextEntry] : Her right eye fully closes now, but still with decreased blinking. Will refill opthalmic drops. We reviewed the importance of eye care.  In addition, I will refer to PT for facial rehabilitation.   I recommend dermotic for her itchy ears.   Cont qvar for cough.   Fu 2-3 months when back from [place].

## 2024-01-02 NOTE — PHYSICAL EXAM
[Nasal Endoscopy Performed] : nasal endoscopy was performed, see procedure section for findings [] : septum deviated to the left [Normal] : mucosa is normal [Midline] : trachea located in midline position [de-identified] : b/l tonsillar hypertrophy, no exudate  [de-identified] : HB L 1/6 HB R upper division 2-3/6 HB R lower division 3/6

## 2024-01-02 NOTE — HISTORY OF PRESENT ILLNESS
[de-identified] : Update 1/2/24: f/u facial palsy and fatigue, found to have EBV. Pt also with chronic cough, improved on steroids and started on qvar. CT also obtained with mild mucosal thickening throughout sinuses, denies symptoms of sinusitis. She still has some difficulty with closing her eye and her mouth however has improved about 80%. She also no longer has cough but is starting to act up again. Ears feel very itchy, uses triamcinolone ointment occasionally with relief.   63F with 1 month of facial palsy and 6-8 weeks of feeling very fatigued and run down. At the time of her facial palsy, (8/31/23) she presented to [facility] and underwent imaging. She was placed on a 6 day pred taper. She does not remember the dose. She does not think she was given antivirals. Several days after the presentation of the facial palsy, she developed very severe sharp neuropathic right ear pain. She does not report being told she had vesicles at that time. She does not have a history of chicken pox.  She reports h/o inner ear infection many years ago and she has since had diminished hearing on the right. She had an MRI at [facility] and was told it was [place] Palsy. Patient went to [facility] about 2 weeks ago, tested negative for covid was started on Augmentin. She had negative lyme testing. She reports continued severe fatigue. She does have a cough, which improved when she was on steroids at [facility]. She reports her facial function has improved over last several days.  Also c/o recurrent sinus infections and chronic nasal congestion. Recieves abx for sinus infection twice yearly and regularly uses Azelastine without relief.

## 2024-01-02 NOTE — ASSESSMENT
[FreeTextEntry1] : 63F presenting previously with EBV and facial palsy, recovering, and chronic sinusitis and chronic cough resolved with qvar.

## 2024-04-08 ENCOUNTER — APPOINTMENT (OUTPATIENT)
Dept: INTERNAL MEDICINE | Facility: CLINIC | Age: 64
End: 2024-04-08
Payer: COMMERCIAL

## 2024-04-08 ENCOUNTER — NON-APPOINTMENT (OUTPATIENT)
Age: 64
End: 2024-04-08

## 2024-04-08 VITALS
HEART RATE: 87 BPM | HEIGHT: 67 IN | WEIGHT: 186 LBS | SYSTOLIC BLOOD PRESSURE: 133 MMHG | BODY MASS INDEX: 29.19 KG/M2 | DIASTOLIC BLOOD PRESSURE: 76 MMHG

## 2024-04-08 DIAGNOSIS — Z00.00 ENCOUNTER FOR GENERAL ADULT MEDICAL EXAMINATION W/OUT ABNORMAL FINDINGS: ICD-10-CM

## 2024-04-08 DIAGNOSIS — E55.9 VITAMIN D DEFICIENCY, UNSPECIFIED: ICD-10-CM

## 2024-04-08 DIAGNOSIS — F41.1 GENERALIZED ANXIETY DISORDER: ICD-10-CM

## 2024-04-08 DIAGNOSIS — F41.9 ANXIETY DISORDER, UNSPECIFIED: ICD-10-CM

## 2024-04-08 DIAGNOSIS — E53.8 DEFICIENCY OF OTHER SPECIFIED B GROUP VITAMINS: ICD-10-CM

## 2024-04-08 DIAGNOSIS — D64.9 ANEMIA, UNSPECIFIED: ICD-10-CM

## 2024-04-08 DIAGNOSIS — R79.9 ABNORMAL FINDING OF BLOOD CHEMISTRY, UNSPECIFIED: ICD-10-CM

## 2024-04-08 DIAGNOSIS — E78.5 HYPERLIPIDEMIA, UNSPECIFIED: ICD-10-CM

## 2024-04-08 PROCEDURE — 93000 ELECTROCARDIOGRAM COMPLETE: CPT

## 2024-04-08 PROCEDURE — 99396 PREV VISIT EST AGE 40-64: CPT

## 2024-04-08 NOTE — HISTORY OF PRESENT ILLNESS
[FreeTextEntry1] : physical exam  [de-identified] : Ms. CARLOS BHANDARI is a 63 year female with recent diagnosis of  right side [place] Palsy comes to the office  for physical exam. Patient denies fever, cough SOB. No other complaints at this time.

## 2024-04-08 NOTE — HEALTH RISK ASSESSMENT
[Good] : ~his/her~  mood as  good [HIV test declined] : HIV test declined [Hepatitis C test declined] : Hepatitis C test declined [Never] : Never

## 2024-04-09 LAB
25(OH)D3 SERPL-MCNC: 27.9 NG/ML
ALBUMIN SERPL ELPH-MCNC: 4.6 G/DL
ALP BLD-CCNC: 69 U/L
ALT SERPL-CCNC: 27 U/L
ANION GAP SERPL CALC-SCNC: 14 MMOL/L
AST SERPL-CCNC: 23 U/L
BASOPHILS # BLD AUTO: 0.02 K/UL
BASOPHILS NFR BLD AUTO: 0.2 %
BILIRUB SERPL-MCNC: 0.9 MG/DL
BUN SERPL-MCNC: 22 MG/DL
CALCIUM SERPL-MCNC: 9.9 MG/DL
CHLORIDE SERPL-SCNC: 104 MMOL/L
CHOLEST SERPL-MCNC: 171 MG/DL
CO2 SERPL-SCNC: 24 MMOL/L
CREAT SERPL-MCNC: 0.77 MG/DL
EGFR: 87 ML/MIN/1.73M2
EOSINOPHIL # BLD AUTO: 0.09 K/UL
EOSINOPHIL NFR BLD AUTO: 1.1 %
ESTIMATED AVERAGE GLUCOSE: 123 MG/DL
FOLATE SERPL-MCNC: 17.8 NG/ML
GLUCOSE SERPL-MCNC: 117 MG/DL
HBA1C MFR BLD HPLC: 5.9 %
HCT VFR BLD CALC: 44.2 %
HDLC SERPL-MCNC: 67 MG/DL
HGB BLD-MCNC: 14.1 G/DL
IMM GRANULOCYTES NFR BLD AUTO: 0.2 %
IRON SATN MFR SERPL: 24 %
IRON SERPL-MCNC: 78 UG/DL
LDLC SERPL CALC-MCNC: 87 MG/DL
LYMPHOCYTES # BLD AUTO: 3.2 K/UL
LYMPHOCYTES NFR BLD AUTO: 37.6 %
MAGNESIUM SERPL-MCNC: 2.3 MG/DL
MAN DIFF?: NORMAL
MCHC RBC-ENTMCNC: 29.4 PG
MCHC RBC-ENTMCNC: 31.9 GM/DL
MCV RBC AUTO: 92.3 FL
MONOCYTES # BLD AUTO: 0.68 K/UL
MONOCYTES NFR BLD AUTO: 8 %
NEUTROPHILS # BLD AUTO: 4.49 K/UL
NEUTROPHILS NFR BLD AUTO: 52.9 %
NONHDLC SERPL-MCNC: 104 MG/DL
PLATELET # BLD AUTO: 318 K/UL
POTASSIUM SERPL-SCNC: 4.4 MMOL/L
PROT SERPL-MCNC: 7.5 G/DL
RBC # BLD: 4.79 M/UL
RBC # FLD: 13.6 %
SODIUM SERPL-SCNC: 142 MMOL/L
TIBC SERPL-MCNC: 321 UG/DL
TRIGL SERPL-MCNC: 89 MG/DL
TSH SERPL-ACNC: 0.86 UIU/ML
UIBC SERPL-MCNC: 243 UG/DL
VIT B12 SERPL-MCNC: 771 PG/ML
WBC # FLD AUTO: 8.5 K/UL

## 2024-04-17 ENCOUNTER — TRANSCRIPTION ENCOUNTER (OUTPATIENT)
Age: 64
End: 2024-04-17

## 2024-04-26 ENCOUNTER — TRANSCRIPTION ENCOUNTER (OUTPATIENT)
Age: 64
End: 2024-04-26

## 2024-05-10 ENCOUNTER — APPOINTMENT (OUTPATIENT)
Dept: CARDIOLOGY | Facility: CLINIC | Age: 64
End: 2024-05-10
Payer: COMMERCIAL

## 2024-05-10 ENCOUNTER — NON-APPOINTMENT (OUTPATIENT)
Age: 64
End: 2024-05-10

## 2024-05-10 VITALS
DIASTOLIC BLOOD PRESSURE: 80 MMHG | RESPIRATION RATE: 16 BRPM | SYSTOLIC BLOOD PRESSURE: 124 MMHG | HEIGHT: 67 IN | OXYGEN SATURATION: 95 % | BODY MASS INDEX: 29.03 KG/M2 | WEIGHT: 185 LBS | HEART RATE: 71 BPM

## 2024-05-10 DIAGNOSIS — R00.2 PALPITATIONS: ICD-10-CM

## 2024-05-10 DIAGNOSIS — R73.03 PREDIABETES.: ICD-10-CM

## 2024-05-10 DIAGNOSIS — E78.00 PURE HYPERCHOLESTEROLEMIA, UNSPECIFIED: ICD-10-CM

## 2024-05-10 DIAGNOSIS — R53.83 OTHER FATIGUE: ICD-10-CM

## 2024-05-10 DIAGNOSIS — Z82.49 FAMILY HISTORY OF ISCHEMIC HEART DISEASE AND OTHER DISEASES OF THE CIRCULATORY SYSTEM: ICD-10-CM

## 2024-05-10 DIAGNOSIS — R42 DIZZINESS AND GIDDINESS: ICD-10-CM

## 2024-05-10 DIAGNOSIS — R06.02 SHORTNESS OF BREATH: ICD-10-CM

## 2024-05-10 PROCEDURE — G2211 COMPLEX E/M VISIT ADD ON: CPT

## 2024-05-10 PROCEDURE — 99204 OFFICE O/P NEW MOD 45 MIN: CPT

## 2024-05-10 PROCEDURE — 93000 ELECTROCARDIOGRAM COMPLETE: CPT

## 2024-05-14 ENCOUNTER — APPOINTMENT (OUTPATIENT)
Dept: OTOLARYNGOLOGY | Facility: CLINIC | Age: 64
End: 2024-05-14
Payer: COMMERCIAL

## 2024-05-14 VITALS
HEART RATE: 99 BPM | DIASTOLIC BLOOD PRESSURE: 87 MMHG | WEIGHT: 182 LBS | SYSTOLIC BLOOD PRESSURE: 148 MMHG | HEIGHT: 67 IN | BODY MASS INDEX: 28.56 KG/M2

## 2024-05-14 DIAGNOSIS — J35.1 HYPERTROPHY OF TONSILS: ICD-10-CM

## 2024-05-14 DIAGNOSIS — G51.0 BELL'S PALSY: ICD-10-CM

## 2024-05-14 DIAGNOSIS — H60.549 ACUTE ECZEMATOID OTITIS EXTERNA, UNSPECIFIED EAR: ICD-10-CM

## 2024-05-14 PROCEDURE — 31231 NASAL ENDOSCOPY DX: CPT

## 2024-05-14 PROCEDURE — 99213 OFFICE O/P EST LOW 20 MIN: CPT | Mod: 25

## 2024-05-14 RX ORDER — ATORVASTATIN CALCIUM 10 MG/1
10 TABLET, FILM COATED ORAL
Qty: 90 | Refills: 1 | Status: COMPLETED | COMMUNITY
Start: 2022-07-19 | End: 2024-05-14

## 2024-05-14 RX ORDER — TRIAMCINOLONE ACETONIDE 5 MG/G
0.5 OINTMENT TOPICAL 3 TIMES DAILY
Qty: 1 | Refills: 1 | Status: ACTIVE | COMMUNITY
Start: 2024-05-14 | End: 1900-01-01

## 2024-05-14 NOTE — PLAN
[TextEntry] : She has previously been recommended to see a rheumatologist for her chronic fatigue. I also recommend an endocrinologist.   I will refer her to Dr. Brown for a second opinion regarding her eye care, as she continues to be bothered.   For her tonsillar hypertrophy, we reviewed that the primary indication would be if this was resulting in poor sleep and leading to her fatigue. She does not feel that her sleep quality is poor and is not interested in tonsillectomy at this time.   I have refilled her triamcinalone for her itching ears.   Follow up PRN.

## 2024-05-14 NOTE — PROCEDURE
[FreeTextEntry6] : Nasal Endoscopy: Procedure: Bilateral nasal endoscopy (CPT 85639)   Indication: Anterior rhinoscopy was inadequate to evaluate pathology.  Left: Septum midline Moderate inferior turbinate hypertrophy  Middle meatus clear, without masses, polyps, or pus Sphenoethmoidal recess clear, without masses, polyps, or pus  Right:  Septum midline,  Moderate inferior turbinate hypertrophy Middle meatus clear, without masses, polyps, or pus  Sphenoethmoidal recess clear, without masses, polyps, or pus [de-identified] : Laryngoscopy: NPL: Nasopharynx clear without masses Base of tongue without masses or lesions Vallecula clear Pyriforms clear Epiglottis crisp TVFs mobile bilaterally  Arytenoids normal Glottic airway patent

## 2024-05-14 NOTE — PHYSICAL EXAM
[Nasal Endoscopy Performed] : nasal endoscopy was performed, see procedure section for findings [] : septum deviated to the left [Normal] : mucosa is normal [Midline] : trachea located in midline position [de-identified] : b/l tonsillar hypertrophy, no exudate  [de-identified] : HB R upper division 2/6 HB R lower division 1/6

## 2024-05-14 NOTE — HISTORY OF PRESENT ILLNESS
[de-identified] : Update 5/14/24: f/u facial palsy and fatigue, found to have EBV.  She reports that her palsy has improved, but still not resolved. She reports R eye is dry and bothersome. She has been following with ophthalmologist. She is still using drops/ointment and sometimes moisture chamber at night. She is able to close it completely. She also reports that her ears are itching with skin flaking. Dermotic did not help. Steroid ointment has been most helpful. SHe continues to feel fatigue.   Update 1/2/24: f/u facial palsy and fatigue, found to have EBV. Pt also with chronic cough, improved on steroids and started on qvar. CT also obtained with mild mucosal thickening throughout sinuses, denies symptoms of sinusitis. She still has some difficulty with closing her eye and her mouth however has improved about 80%. She also no longer has cough but is starting to act up again. Ears feel very itchy, uses triamcinolone ointment occasionally with relief.   63F with 1 month of facial palsy and 6-8 weeks of feeling very fatigued and run down. At the time of her facial palsy, (8/31/23) she presented to [facility] and underwent imaging. She was placed on a 6 day pred taper. She does not remember the dose. She does not think she was given antivirals. Several days after the presentation of the facial palsy, she developed very severe sharp neuropathic right ear pain. She does not report being told she had vesicles at that time. She does not have a history of chicken pox.  She reports h/o inner ear infection many years ago and she has since had diminished hearing on the right. She had an MRI at [facility] and was told it was [place] Palsy. Patient went to [facility]gent care about 2 weeks ago, tested negative for covid was started on Augmentin. She had negative lyme testing. She reports continued severe fatigue. She does have a cough, which improved when she was on steroids at [facility]. She reports her facial function has improved over last several days.  Also c/o recurrent sinus infections and chronic nasal congestion. Recieves abx for sinus infection twice yearly and regularly uses Azelastine without relief.

## 2024-05-24 ENCOUNTER — NON-APPOINTMENT (OUTPATIENT)
Age: 64
End: 2024-05-24

## 2024-05-24 NOTE — CONSULT LETTER
[FreeTextEntry1] : Rachna Root  [FreeTextEntry2] : Fatigue after EBV  Bell'sPalsy  [FreeTextEntry3] : Pt is a 64 yo female  with a history of high cholesterol PreDM, found to have [place]'s palsy since August 2023.   endocrine e-Consult  requested for evaluation of persistent fatigue..    PMH  as above   HTN? anxiety  carpal  tunnel  Vitamin B12 deficiency  Vitamin D deficiency   PMH  varicose vein ligation   FH    DM- father Paternal GM  COPD  father  HTn father  cardiac disease Father Pat GM -    Soc HX  nonsmoker social alcohol use  Meds Qvar  Triamcinolone ointment  Flucinolone oil  DermOtic    All Sulfa Sulfur   Labs  April 9 2024  	Test  	Result  	Flag	Reference	Goal  	TSHX	0.86 uIU/mL	 	0.27-4.20  	Test  	Result  	Flag	Reference	Goal 	HGB A1C	5.9 %	H	4.0-5.6   [FreeTextEntry4] : Chronic fatigue likely due to  EBV    TSH wnl  can repeat TSH with Free T4  after pt hold Biotin containing supplements for 3 days  can  check labs at 8 AM as well for ACTH And cortisol but pt will need to hold her topical steroids for 3 days -although pt not anemic can check Iron TIBC Ferritin  -Consider ordering tick panel as well  [FreeTextEntry5] : If there are any further questions, please reach out  to me

## 2024-06-13 ENCOUNTER — APPOINTMENT (OUTPATIENT)
Dept: CARDIOLOGY | Facility: CLINIC | Age: 64
End: 2024-06-13
Payer: COMMERCIAL

## 2024-06-13 PROCEDURE — 93306 TTE W/DOPPLER COMPLETE: CPT

## 2024-06-22 NOTE — ADDENDUM
[FreeTextEntry1] : 6/22/2024: 14 day MCOT with SR and low burden PVCs of 1%. No events and all SR. Echo with LVE and mild AI/MR/TR not clinically significant. Normal BiV function. Surveillance only of mild VHD. Stress test pending and follow up after

## 2024-06-22 NOTE — DISCUSSION/SUMMARY
[EKG obtained to assist in diagnosis and management of assessed problem(s)] : EKG obtained to assist in diagnosis and management of assessed problem(s) [FreeTextEntry1] : Patient is a 62yo F with [place] Palsy, HLD, prediabetes, family history cardiac disease (unclear if CABG or valve replacement in 60s)  here for cardiac evaluation of palpitations.  -Lipids from 4/2024 controlled on statin, she stopped after that BW however . LDL in past had been 177 -BP within acceptable range -Pre-diabetic, diet/lifestyle modifications advised -Symptoms non-speficic, will evaluate for cardiac etiology   1. Echo/EST to evaluate SOB/fatigue/palps 2. 14 day MCOT to eval palps 3. Consider rheum evaluation, ? chronic fatigue syndrome  4. TRial of statin per patient preference, will n eed to recheck lipids with PMD in a couple months 5. IF unable to run on treadmill will plan either pharm nuclear stress or cardiac CTA 6.. Follow up after testing

## 2024-06-22 NOTE — HISTORY OF PRESENT ILLNESS
[FreeTextEntry1] : Patient is a 62yo F with [place] Palsy, HLD,  here for cardiac evaluation of palpitations. Since mono last year continues to feel poor energy and shortness of breath with minimal activity. Even cooking in kitchen/showering wont feel well. Also gets dizzy with activity. Feels tired up stairs as well. Denies chest pain/pressure. GEts intermittent palps at least weekly. Will resolve after relaxing. No PND/orthopnea/sycnope/edema.    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 Doing home renovations making apt for daughter and grandson.   ROS: GI negative, all others negative

## 2024-06-22 NOTE — ASSESSMENT
[FreeTextEntry1] : ECG: SR, no significant ST-T abnormalities and normal intervals     HDL 67 LDL 87 TG 89 (4/2024)  CMP unremarkable other than glucose 117  A1C 5.9 (4/2024)

## 2024-07-05 ENCOUNTER — APPOINTMENT (OUTPATIENT)
Dept: CARDIOLOGY | Facility: CLINIC | Age: 64
End: 2024-07-05
Payer: COMMERCIAL

## 2024-07-05 PROCEDURE — 93015 CV STRESS TEST SUPVJ I&R: CPT

## 2024-07-10 ENCOUNTER — APPOINTMENT (OUTPATIENT)
Dept: CARDIOLOGY | Facility: CLINIC | Age: 64
End: 2024-07-10
Payer: COMMERCIAL

## 2024-07-10 VITALS
DIASTOLIC BLOOD PRESSURE: 72 MMHG | SYSTOLIC BLOOD PRESSURE: 108 MMHG | WEIGHT: 182 LBS | BODY MASS INDEX: 28.56 KG/M2 | HEART RATE: 76 BPM | OXYGEN SATURATION: 97 % | RESPIRATION RATE: 16 BRPM | HEIGHT: 67 IN

## 2024-07-10 DIAGNOSIS — E78.5 HYPERLIPIDEMIA, UNSPECIFIED: ICD-10-CM

## 2024-07-10 DIAGNOSIS — R53.83 OTHER FATIGUE: ICD-10-CM

## 2024-07-10 DIAGNOSIS — R00.2 PALPITATIONS: ICD-10-CM

## 2024-07-10 PROCEDURE — 99214 OFFICE O/P EST MOD 30 MIN: CPT

## 2024-07-10 PROCEDURE — G2211 COMPLEX E/M VISIT ADD ON: CPT

## 2024-09-17 ENCOUNTER — APPOINTMENT (OUTPATIENT)
Dept: RHEUMATOLOGY | Facility: CLINIC | Age: 64
End: 2024-09-17

## 2025-07-09 ENCOUNTER — NON-APPOINTMENT (OUTPATIENT)
Age: 65
End: 2025-07-09

## 2025-07-10 ENCOUNTER — APPOINTMENT (OUTPATIENT)
Dept: INTERNAL MEDICINE | Facility: CLINIC | Age: 65
End: 2025-07-10
Payer: COMMERCIAL

## 2025-07-10 VITALS
WEIGHT: 173.44 LBS | SYSTOLIC BLOOD PRESSURE: 117 MMHG | HEIGHT: 67 IN | DIASTOLIC BLOOD PRESSURE: 74 MMHG | HEART RATE: 73 BPM | BODY MASS INDEX: 27.22 KG/M2

## 2025-07-10 PROCEDURE — 99396 PREV VISIT EST AGE 40-64: CPT

## 2025-07-11 LAB
ALBUMIN SERPL ELPH-MCNC: 4.6 G/DL
ALP BLD-CCNC: 77 U/L
ALT SERPL-CCNC: 27 U/L
ANION GAP SERPL CALC-SCNC: 13 MMOL/L
AST SERPL-CCNC: 26 U/L
BASOPHILS # BLD AUTO: 0.02 K/UL
BASOPHILS NFR BLD AUTO: 0.3 %
BILIRUB SERPL-MCNC: 0.8 MG/DL
BUN SERPL-MCNC: 16 MG/DL
CALCIUM SERPL-MCNC: 10.3 MG/DL
CHLORIDE SERPL-SCNC: 107 MMOL/L
CHOLEST SERPL-MCNC: 263 MG/DL
CO2 SERPL-SCNC: 23 MMOL/L
CREAT SERPL-MCNC: 0.55 MG/DL
EGFRCR SERPLBLD CKD-EPI 2021: 102 ML/MIN/1.73M2
EOSINOPHIL # BLD AUTO: 0.11 K/UL
EOSINOPHIL NFR BLD AUTO: 1.6 %
ESTIMATED AVERAGE GLUCOSE: 126 MG/DL
GLUCOSE SERPL-MCNC: 89 MG/DL
HBA1C MFR BLD HPLC: 6 %
HCT VFR BLD CALC: 44.7 %
HDLC SERPL-MCNC: 64 MG/DL
HGB BLD-MCNC: 13.9 G/DL
IMM GRANULOCYTES NFR BLD AUTO: 0 %
LDLC SERPL-MCNC: 187 MG/DL
LYMPHOCYTES # BLD AUTO: 3.08 K/UL
LYMPHOCYTES NFR BLD AUTO: 45.9 %
MAN DIFF?: NORMAL
MCHC RBC-ENTMCNC: 29.1 PG
MCHC RBC-ENTMCNC: 31.1 G/DL
MCV RBC AUTO: 93.7 FL
MONOCYTES # BLD AUTO: 0.69 K/UL
MONOCYTES NFR BLD AUTO: 10.3 %
NEUTROPHILS # BLD AUTO: 2.81 K/UL
NEUTROPHILS NFR BLD AUTO: 41.9 %
NONHDLC SERPL-MCNC: 199 MG/DL
PLATELET # BLD AUTO: 275 K/UL
POTASSIUM SERPL-SCNC: 4.5 MMOL/L
PROT SERPL-MCNC: 7.5 G/DL
RBC # BLD: 4.77 M/UL
RBC # FLD: 14.3 %
SODIUM SERPL-SCNC: 143 MMOL/L
TRIGL SERPL-MCNC: 72 MG/DL
TSH SERPL-ACNC: 1.03 UIU/ML
WBC # FLD AUTO: 6.71 K/UL